# Patient Record
Sex: FEMALE | Race: WHITE | NOT HISPANIC OR LATINO | URBAN - METROPOLITAN AREA
[De-identification: names, ages, dates, MRNs, and addresses within clinical notes are randomized per-mention and may not be internally consistent; named-entity substitution may affect disease eponyms.]

---

## 2021-01-13 ENCOUNTER — TELEPHONE ENCOUNTER (OUTPATIENT)
Dept: URBAN - METROPOLITAN AREA CLINIC 92 | Facility: CLINIC | Age: 66
End: 2021-01-13

## 2021-03-12 ENCOUNTER — OFFICE VISIT (OUTPATIENT)
Dept: URBAN - METROPOLITAN AREA TELEHEALTH 2 | Facility: TELEHEALTH | Age: 66
End: 2021-03-12
Payer: COMMERCIAL

## 2021-03-12 VITALS — HEIGHT: 62 IN | BODY MASS INDEX: 23.92 KG/M2 | WEIGHT: 130 LBS

## 2021-03-12 DIAGNOSIS — R19.6 HALITOSIS: ICD-10-CM

## 2021-03-12 DIAGNOSIS — K57.30 COLON, DIVERTICULOSIS: ICD-10-CM

## 2021-03-12 DIAGNOSIS — Z83.71 FAMILY HISTORY OF COLONIC POLYPS: ICD-10-CM

## 2021-03-12 DIAGNOSIS — K31.89 SUBMUCOSAL LESION OF STOMACH: ICD-10-CM

## 2021-03-12 DIAGNOSIS — R12 HEARTBURN: ICD-10-CM

## 2021-03-12 PROCEDURE — 99243 OFF/OP CNSLTJ NEW/EST LOW 30: CPT | Performed by: INTERNAL MEDICINE

## 2021-03-12 PROCEDURE — 99203 OFFICE O/P NEW LOW 30 MIN: CPT | Performed by: INTERNAL MEDICINE

## 2021-03-12 RX ORDER — BUPROPION HCL 150 MG
TAKE 1 TABLET (150 MG) BY ORAL ROUTE ONCE DAILY FOR 30 DAYS TABLET, EXTENDED RELEASE 24 HR ORAL 1
Qty: 30 | Refills: 0 | Status: ACTIVE | COMMUNITY
Start: 1900-01-01

## 2021-03-12 RX ORDER — TELMISARTAN 20 MG/1
TAKE 1 TABLET (20 MG) BY ORAL ROUTE ONCE DAILY FOR 30 DAYS TABLET ORAL 1
Qty: 30 | Refills: 0 | Status: ACTIVE | COMMUNITY
Start: 1900-01-01

## 2021-03-12 RX ORDER — CETIRIZINE HYDROCHLORIDE 10 MG/1
TAKE 1 TABLET (10 MG) BY ORAL ROUTE ONCE DAILY FOR 30 DAYS TABLET, FILM COATED ORAL 1
Qty: 30 | Refills: 0 | Status: ACTIVE | COMMUNITY
Start: 1900-01-01

## 2021-03-12 RX ORDER — SODIUM, POTASSIUM,MAG SULFATES 17.5-3.13G
354ML SOLUTION, RECONSTITUTED, ORAL ORAL
Qty: 354 MILLILITER | Refills: 0 | OUTPATIENT
Start: 2021-03-12 | End: 2021-03-13

## 2021-03-12 NOTE — HPI-OTHER HISTORIES
This is a 66-year-old female who presents for consult referred by Dr. Zeeshan Gallardo. She was last seen in 2016.   She had a colonoscopy at age 52 that showed a hyperplastic polyp. She had a colonoscopy on 9/28/16 that showed diverticulosis of the sigmoid colon and internal hemorrhoids. Biopsies of the terminal ileum and colon were unremarkable. She had sx c/w IBS-D and was recommended to treat with a course of Xifaxan for 2weeks but the patient declined treatment citing high deductible for the medication. She is taking citrucel daily and BM formed and daily. No hematochezia or melena. Her father had colon polyps.   Patient previously was seen in 2013 for a gastric submucosal lesion. She initially had EUS at [place] for a gastric submucosal lesion in the body of the stomach. It arises from the muscularis propria. She had repeat EUS at [facility] on 4/16/13 that demonstrated 1.1 x 0.8cm gastric nodule arising from muscularis propria. Deep well biopsies were non-diagnostic. She was recommended to have repeat procedure in 1-2years to ensure stability. Repeat procedure on 7/19/16 showed stable lesion measuring 1.1cm by 0.8cm. No additional biopsies were obtained. There was an also 2cm hiatal hernia, gastritis without H pylori and fundic gland polyps.   She has reflux and was well controlled on Omeprazole 40mg po daily but she stopped this years ago. She now notes with COVID and wearing a mask sign halitosis. Recently she had nocturnal heartburn X 1 but otherwise denies regurgitation or dysphagia. No chest pain. No N/V. She saw her PCP and was given omeprazole and notes halitosis has resolved. She denies any abd pain, anorexia or weight loss.

## 2021-05-13 ENCOUNTER — OFFICE VISIT (OUTPATIENT)
Dept: URBAN - METROPOLITAN AREA SURGERY CENTER 16 | Facility: SURGERY CENTER | Age: 66
End: 2021-05-13

## 2021-05-24 ENCOUNTER — OFFICE VISIT (OUTPATIENT)
Dept: URBAN - METROPOLITAN AREA TELEHEALTH 2 | Facility: TELEHEALTH | Age: 66
End: 2021-05-24

## 2021-05-24 ENCOUNTER — TELEPHONE ENCOUNTER (OUTPATIENT)
Dept: URBAN - METROPOLITAN AREA CLINIC 92 | Facility: CLINIC | Age: 66
End: 2021-05-24

## 2021-05-24 RX ORDER — OMEPRAZOLE 40 MG/1
1 CAPSULE 30 MINUTES BEFORE MORNING MEAL CAPSULE, DELAYED RELEASE ORAL ONCE A DAY
Qty: 30 | OUTPATIENT
Start: 2021-05-24

## 2021-05-24 NOTE — HPI-OTHER HISTORIES
This is a 66-year-old female who presents for consult referred by Dr. Zeehsan Gallardo. She was last seen in 2016.   She had a colonoscopy at age 52 that showed a hyperplastic polyp. She had a colonoscopy on 9/28/16 that showed diverticulosis of the sigmoid colon and internal hemorrhoids. Biopsies of the terminal ileum and colon were unremarkable. She had sx c/w IBS-D and was recommended to treat with a course of Xifaxan for 2weeks but the patient declined treatment citing high deductible for the medication. She is taking citrucel daily and BM formed and daily. No hematochezia or melena. Her father had colon polyps.   Patient previously was seen in 2013 for a gastric submucosal lesion. She initially had EUS at [place] for a gastric submucosal lesion in the body of the stomach. It arises from the muscularis propria. She had repeat EUS at [facility] on 4/16/13 that demonstrated 1.1 x 0.8cm gastric nodule arising from muscularis propria. Deep well biopsies were non-diagnostic. She was recommended to have repeat procedure in 1-2years to ensure stability. Repeat procedure on 7/19/16 showed stable lesion measuring 1.1cm by 0.8cm. No additional biopsies were obtained. There was an also 2cm hiatal hernia, gastritis without H pylori and fundic gland polyps.   She has reflux and was well controlled on Omeprazole 40mg po daily but she stopped this years ago. She now notes with COVID and wearing a mask sign halitosis. Recently she had nocturnal heartburn X 1 but otherwise denies regurgitation or dysphagia. No chest pain. No N/V. She saw her PCP and was given omeprazole and notes halitosis has resolved. She denies any abd pain, anorexia or weight loss.

## 2021-07-14 ENCOUNTER — TELEPHONE ENCOUNTER (OUTPATIENT)
Dept: URBAN - METROPOLITAN AREA CLINIC 92 | Facility: CLINIC | Age: 66
End: 2021-07-14

## 2021-07-15 ENCOUNTER — CLAIMS CREATED FROM THE CLAIM WINDOW (OUTPATIENT)
Dept: URBAN - METROPOLITAN AREA CLINIC 4 | Facility: CLINIC | Age: 66
End: 2021-07-15
Payer: COMMERCIAL

## 2021-07-15 ENCOUNTER — OFFICE VISIT (OUTPATIENT)
Dept: URBAN - METROPOLITAN AREA SURGERY CENTER 16 | Facility: SURGERY CENTER | Age: 66
End: 2021-07-15
Payer: COMMERCIAL

## 2021-07-15 DIAGNOSIS — K31.89 GASTRIC FOVEOLAR HYPERPLASIA: ICD-10-CM

## 2021-07-15 DIAGNOSIS — K63.89 POLYP OF ILEUM: ICD-10-CM

## 2021-07-15 DIAGNOSIS — K31.89 ACQUIRED DEFORMITY OF DUODENUM: ICD-10-CM

## 2021-07-15 DIAGNOSIS — R12 HEARTBURN: ICD-10-CM

## 2021-07-15 DIAGNOSIS — Z83.71 FAMILY HISTORY OF COLONIC POLYPS: ICD-10-CM

## 2021-07-15 PROCEDURE — 43239 EGD BIOPSY SINGLE/MULTIPLE: CPT | Performed by: INTERNAL MEDICINE

## 2021-07-15 PROCEDURE — G8907 PT DOC NO EVENTS ON DISCHARG: HCPCS | Performed by: INTERNAL MEDICINE

## 2021-07-15 PROCEDURE — 88342 IMHCHEM/IMCYTCHM 1ST ANTB: CPT | Performed by: PATHOLOGY

## 2021-07-15 PROCEDURE — 88312 SPECIAL STAINS GROUP 1: CPT | Performed by: PATHOLOGY

## 2021-07-15 PROCEDURE — 88305 TISSUE EXAM BY PATHOLOGIST: CPT | Performed by: PATHOLOGY

## 2021-07-15 PROCEDURE — G0105 COLORECTAL SCRN; HI RISK IND: HCPCS | Performed by: INTERNAL MEDICINE

## 2021-07-15 RX ORDER — TELMISARTAN 20 MG/1
TAKE 1 TABLET (20 MG) BY ORAL ROUTE ONCE DAILY FOR 30 DAYS TABLET ORAL 1
Qty: 30 | Refills: 0 | Status: ACTIVE | COMMUNITY
Start: 1900-01-01

## 2021-07-15 RX ORDER — CETIRIZINE HYDROCHLORIDE 10 MG/1
TAKE 1 TABLET (10 MG) BY ORAL ROUTE ONCE DAILY FOR 30 DAYS TABLET, FILM COATED ORAL 1
Qty: 30 | Refills: 0 | Status: ACTIVE | COMMUNITY
Start: 1900-01-01

## 2021-07-15 RX ORDER — OMEPRAZOLE 40 MG/1
1 CAPSULE 30 MINUTES BEFORE MORNING MEAL CAPSULE, DELAYED RELEASE ORAL ONCE A DAY
Qty: 30 | Status: ACTIVE | COMMUNITY
Start: 2021-05-24

## 2021-07-15 RX ORDER — BUPROPION HCL 150 MG
TAKE 1 TABLET (150 MG) BY ORAL ROUTE ONCE DAILY FOR 30 DAYS TABLET, EXTENDED RELEASE 24 HR ORAL 1
Qty: 30 | Refills: 0 | Status: ACTIVE | COMMUNITY
Start: 1900-01-01

## 2021-07-30 ENCOUNTER — OFFICE VISIT (OUTPATIENT)
Dept: URBAN - METROPOLITAN AREA CLINIC 92 | Facility: CLINIC | Age: 66
End: 2021-07-30
Payer: COMMERCIAL

## 2021-07-30 ENCOUNTER — TELEPHONE ENCOUNTER (OUTPATIENT)
Dept: URBAN - METROPOLITAN AREA CLINIC 92 | Facility: CLINIC | Age: 66
End: 2021-07-30

## 2021-07-30 VITALS
HEART RATE: 65 BPM | DIASTOLIC BLOOD PRESSURE: 78 MMHG | WEIGHT: 129 LBS | HEIGHT: 62 IN | SYSTOLIC BLOOD PRESSURE: 127 MMHG | BODY MASS INDEX: 23.74 KG/M2 | TEMPERATURE: 98 F

## 2021-07-30 DIAGNOSIS — R12 HEARTBURN: ICD-10-CM

## 2021-07-30 DIAGNOSIS — K57.90 DIVERTICULOSIS: ICD-10-CM

## 2021-07-30 DIAGNOSIS — R19.6 HALITOSIS: ICD-10-CM

## 2021-07-30 DIAGNOSIS — Z83.71 FAMILY HISTORY OF COLONIC POLYPS: ICD-10-CM

## 2021-07-30 DIAGNOSIS — K31.89 SUBMUCOSAL LESION OF STOMACH: ICD-10-CM

## 2021-07-30 PROBLEM — 79879001: Status: ACTIVE | Noted: 2021-03-12

## 2021-07-30 PROCEDURE — 99214 OFFICE O/P EST MOD 30 MIN: CPT | Performed by: INTERNAL MEDICINE

## 2021-07-30 RX ORDER — CETIRIZINE HYDROCHLORIDE 10 MG/1
TAKE 1 TABLET (10 MG) BY ORAL ROUTE ONCE DAILY FOR 30 DAYS TABLET, FILM COATED ORAL 1
Qty: 30 | Refills: 0 | Status: ACTIVE | COMMUNITY
Start: 1900-01-01

## 2021-07-30 RX ORDER — OMEPRAZOLE 40 MG/1
1 CAPSULE 30 MINUTES BEFORE MORNING MEAL CAPSULE, DELAYED RELEASE ORAL ONCE A DAY
Qty: 30 | Status: ON HOLD | COMMUNITY
Start: 2021-05-24

## 2021-07-30 RX ORDER — TELMISARTAN 20 MG/1
TAKE 1 TABLET (20 MG) BY ORAL ROUTE ONCE DAILY FOR 30 DAYS TABLET ORAL 1
Qty: 30 | Refills: 0 | Status: ACTIVE | COMMUNITY
Start: 1900-01-01

## 2021-07-30 RX ORDER — BUPROPION HCL 150 MG
TAKE 1 TABLET (150 MG) BY ORAL ROUTE ONCE DAILY FOR 30 DAYS TABLET, EXTENDED RELEASE 24 HR ORAL 1
Qty: 30 | Refills: 0 | Status: ACTIVE | COMMUNITY
Start: 1900-01-01

## 2021-07-30 NOTE — HPI-TODAY'S VISIT:
This is a 66-year-old female who presents for follow-up after recent endoscopy.  She had a colonoscopy at age 52 that showed a hyperplastic polyp. She had a colonoscopy on 9/28/16 that showed diverticulosis of the sigmoid colon and internal hemorrhoids. Biopsies of the terminal ileum and colon were unremarkable. She had sx c/w IBS-D and was recommended to treat with a course of Xifaxan for 2weeks but the patient declined treatment citing high deductible for the medication. She is taking citrucel daily and BM formed and daily. No hematochezia or melena. Her father had colon polyps.   Patient previously was seen in 2013 for a gastric submucosal lesion. She initially had EUS at [place] for a gastric submucosal lesion in the body of the stomach. It arises from the muscularis propria. [facility] that demonstrated 1.1 x 0.8cm gastric nodule arising from muscularis propria. Deep well biopsies were non-diagnostic. She was recommended to have repeat procedure in 1-2years to ensure stability. Repeat procedure on 7/19/16 showed stable lesion measuring 1.1cm by 0.8cm. No additional biopsies were obtained. There was an also 2cm hiatal hernia, gastritis without H pylori and fundic gland polyps.   She has reflux and was well controlled on Omeprazole 40mg po daily but she stopped this years ago. She now notes with COVID and wearing a mask sign halitosis. Recently she had nocturnal heartburn X 1 but otherwise denies regurgitation or dysphagia. No chest pain. No N/V. She saw her PCP and was given omeprazole and notes halitosis has resolved. She denies any abd pain, anorexia or weight loss.   She underwent EGD and colonoscopy on 07/15/2021 that demonstrated a 1.4 cm submucosal lesion which is slightly increased in size.  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  Colonoscopy on 07/15/2021 revealed diverticulosis in sigmoid colon and internal hemorrhoids

## 2021-08-13 PROBLEM — 397881000: Status: ACTIVE | Noted: 2021-03-11

## 2021-08-30 ENCOUNTER — OFFICE VISIT (OUTPATIENT)
Dept: URBAN - METROPOLITAN AREA MEDICAL CENTER 28 | Facility: MEDICAL CENTER | Age: 66
End: 2021-08-30
Payer: COMMERCIAL

## 2021-08-30 DIAGNOSIS — K31.89 ACQUIRED DEFORMITY OF DUODENUM: ICD-10-CM

## 2021-08-30 DIAGNOSIS — R93.3 ABN FINDINGS-GI TRACT: ICD-10-CM

## 2021-08-30 PROCEDURE — 43242 EGD US FINE NEEDLE BX/ASPIR: CPT | Performed by: INTERNAL MEDICINE

## 2021-08-30 RX ORDER — CETIRIZINE HYDROCHLORIDE 10 MG/1
TAKE 1 TABLET (10 MG) BY ORAL ROUTE ONCE DAILY FOR 30 DAYS TABLET, FILM COATED ORAL 1
Qty: 30 | Refills: 0 | Status: ACTIVE | COMMUNITY
Start: 1900-01-01

## 2021-08-30 RX ORDER — BUPROPION HCL 150 MG
TAKE 1 TABLET (150 MG) BY ORAL ROUTE ONCE DAILY FOR 30 DAYS TABLET, EXTENDED RELEASE 24 HR ORAL 1
Qty: 30 | Refills: 0 | Status: ACTIVE | COMMUNITY
Start: 1900-01-01

## 2021-08-30 RX ORDER — TELMISARTAN 20 MG/1
TAKE 1 TABLET (20 MG) BY ORAL ROUTE ONCE DAILY FOR 30 DAYS TABLET ORAL 1
Qty: 30 | Refills: 0 | Status: ACTIVE | COMMUNITY
Start: 1900-01-01

## 2021-08-30 RX ORDER — OMEPRAZOLE 40 MG/1
1 CAPSULE 30 MINUTES BEFORE MORNING MEAL CAPSULE, DELAYED RELEASE ORAL ONCE A DAY
Qty: 30 | Status: ON HOLD | COMMUNITY
Start: 2021-05-24

## 2023-10-20 ENCOUNTER — TELEPHONE ENCOUNTER (OUTPATIENT)
Dept: URBAN - METROPOLITAN AREA CLINIC 92 | Facility: CLINIC | Age: 68
End: 2023-10-20

## 2023-11-17 ENCOUNTER — OFFICE VISIT (OUTPATIENT)
Dept: URBAN - METROPOLITAN AREA MEDICAL CENTER 28 | Facility: MEDICAL CENTER | Age: 68
End: 2023-11-17
Payer: COMMERCIAL

## 2023-11-17 DIAGNOSIS — K31.89 ACHYLIA: ICD-10-CM

## 2023-11-17 DIAGNOSIS — K29.50 ANTRAL GASTRITIS: ICD-10-CM

## 2023-11-17 DIAGNOSIS — R93.3 ABN FINDINGS-GI TRACT: ICD-10-CM

## 2023-11-17 PROCEDURE — 43239 EGD BIOPSY SINGLE/MULTIPLE: CPT | Performed by: INTERNAL MEDICINE

## 2023-11-17 PROCEDURE — 43237 ENDOSCOPIC US EXAM ESOPH: CPT | Performed by: INTERNAL MEDICINE

## 2023-11-17 RX ORDER — OMEPRAZOLE 40 MG/1
1 CAPSULE 30 MINUTES BEFORE MORNING MEAL CAPSULE, DELAYED RELEASE ORAL ONCE A DAY
Qty: 30 | Status: ON HOLD | COMMUNITY
Start: 2021-05-24

## 2023-11-17 RX ORDER — CETIRIZINE HYDROCHLORIDE 10 MG/1
TAKE 1 TABLET (10 MG) BY ORAL ROUTE ONCE DAILY FOR 30 DAYS TABLET, FILM COATED ORAL 1
Qty: 30 | Refills: 0 | Status: ACTIVE | COMMUNITY
Start: 1900-01-01

## 2023-11-17 RX ORDER — BUPROPION HCL 150 MG
TAKE 1 TABLET (150 MG) BY ORAL ROUTE ONCE DAILY FOR 30 DAYS TABLET, EXTENDED RELEASE 24 HR ORAL 1
Qty: 30 | Refills: 0 | Status: ACTIVE | COMMUNITY
Start: 1900-01-01

## 2023-11-17 RX ORDER — TELMISARTAN 20 MG/1
TAKE 1 TABLET (20 MG) BY ORAL ROUTE ONCE DAILY FOR 30 DAYS TABLET ORAL 1
Qty: 30 | Refills: 0 | Status: ACTIVE | COMMUNITY
Start: 1900-01-01

## 2023-11-27 ENCOUNTER — TELEPHONE ENCOUNTER (OUTPATIENT)
Dept: URBAN - METROPOLITAN AREA CLINIC 92 | Facility: CLINIC | Age: 68
End: 2023-11-27

## 2023-12-13 ENCOUNTER — RX ONLY (OUTPATIENT)
Age: 68
Setting detail: RX ONLY
End: 2023-12-13

## 2023-12-13 ENCOUNTER — APPOINTMENT (RX ONLY)
Dept: URBAN - METROPOLITAN AREA CLINIC 164 | Facility: CLINIC | Age: 68
Setting detail: DERMATOLOGY
End: 2023-12-13

## 2023-12-13 DIAGNOSIS — D18.0 HEMANGIOMA: ICD-10-CM

## 2023-12-13 DIAGNOSIS — L81.4 OTHER MELANIN HYPERPIGMENTATION: ICD-10-CM

## 2023-12-13 DIAGNOSIS — L73.8 OTHER SPECIFIED FOLLICULAR DISORDERS: ICD-10-CM

## 2023-12-13 DIAGNOSIS — D22 MELANOCYTIC NEVI: ICD-10-CM

## 2023-12-13 DIAGNOSIS — L82.1 OTHER SEBORRHEIC KERATOSIS: ICD-10-CM

## 2023-12-13 PROBLEM — D18.01 HEMANGIOMA OF SKIN AND SUBCUTANEOUS TISSUE: Status: ACTIVE | Noted: 2023-12-13

## 2023-12-13 PROBLEM — D22.5 MELANOCYTIC NEVI OF TRUNK: Status: ACTIVE | Noted: 2023-12-13

## 2023-12-13 PROCEDURE — ? COUNSELING

## 2023-12-13 PROCEDURE — 99213 OFFICE O/P EST LOW 20 MIN: CPT

## 2023-12-13 RX ORDER — MUPIROCIN 20 MG/G
OINTMENT TOPICAL
Qty: 22 | Refills: 0 | Status: ERX | COMMUNITY
Start: 2023-12-13

## 2023-12-13 RX ORDER — HYDROCORTISONE 25 MG/G
CREAM TOPICAL
Qty: 30 | Refills: 2 | Status: ERX | COMMUNITY
Start: 2023-12-13

## 2023-12-13 ASSESSMENT — LOCATION DETAILED DESCRIPTION DERM
LOCATION DETAILED: RIGHT MEDIAL UPPER BACK
LOCATION DETAILED: LEFT FOREHEAD
LOCATION DETAILED: INFERIOR THORACIC SPINE
LOCATION DETAILED: LEFT MEDIAL UPPER BACK

## 2023-12-13 ASSESSMENT — LOCATION SIMPLE DESCRIPTION DERM
LOCATION SIMPLE: LEFT UPPER BACK
LOCATION SIMPLE: UPPER BACK
LOCATION SIMPLE: LEFT FOREHEAD
LOCATION SIMPLE: RIGHT UPPER BACK

## 2023-12-13 ASSESSMENT — LOCATION ZONE DERM
LOCATION ZONE: TRUNK
LOCATION ZONE: FACE

## 2023-12-13 NOTE — HPI: EVALUATION OF SKIN LESION(S)
Hpi Title: Evaluation of Skin Lesions
What Type Of Note Output Would You Prefer (Optional)?: Standard Output
Additional History: Pt asked for refills for HTC 2.5% and Mupirocin Oint

## 2024-01-23 ENCOUNTER — TELEPHONE ENCOUNTER (OUTPATIENT)
Dept: URBAN - METROPOLITAN AREA CLINIC 92 | Facility: CLINIC | Age: 69
End: 2024-01-23

## 2024-01-24 ENCOUNTER — DASHBOARD ENCOUNTERS (OUTPATIENT)
Age: 69
End: 2024-01-24

## 2024-01-25 ENCOUNTER — OFFICE VISIT (OUTPATIENT)
Dept: URBAN - METROPOLITAN AREA TELEHEALTH 2 | Facility: TELEHEALTH | Age: 69
End: 2024-01-25
Payer: COMMERCIAL

## 2024-01-25 VITALS — HEIGHT: 62 IN | WEIGHT: 138 LBS | BODY MASS INDEX: 25.4 KG/M2

## 2024-01-25 DIAGNOSIS — K31.89 SUBMUCOSAL LESION OF STOMACH: ICD-10-CM

## 2024-01-25 DIAGNOSIS — Z83.71 FAMILY HISTORY OF COLONIC POLYPS: ICD-10-CM

## 2024-01-25 DIAGNOSIS — R12 HEARTBURN: ICD-10-CM

## 2024-01-25 DIAGNOSIS — K57.90 DIVERTICULOSIS: ICD-10-CM

## 2024-01-25 PROCEDURE — 99441 PHONE E/M BY PHYS 5-10 MIN: CPT | Performed by: INTERNAL MEDICINE

## 2024-01-25 RX ORDER — TELMISARTAN 20 MG/1
TAKE 1 TABLET (20 MG) BY ORAL ROUTE ONCE DAILY FOR 30 DAYS TABLET ORAL 1
Qty: 30 | Refills: 0 | Status: ACTIVE | COMMUNITY
Start: 1900-01-01

## 2024-01-25 RX ORDER — BUPROPION HCL 150 MG
TAKE 1 TABLET (150 MG) BY ORAL ROUTE ONCE DAILY FOR 30 DAYS TABLET, EXTENDED RELEASE 24 HR ORAL 1
Qty: 30 | Refills: 0 | Status: ACTIVE | COMMUNITY
Start: 1900-01-01

## 2024-01-25 RX ORDER — OMEPRAZOLE 40 MG/1
1 CAPSULE 30 MINUTES BEFORE MORNING MEAL CAPSULE, DELAYED RELEASE ORAL ONCE A DAY
Qty: 30 | COMMUNITY
Start: 2021-05-24

## 2024-01-25 RX ORDER — CETIRIZINE HYDROCHLORIDE 10 MG/1
TAKE 1 TABLET (10 MG) BY ORAL ROUTE ONCE DAILY FOR 30 DAYS TABLET, FILM COATED ORAL 1
Qty: 30 | Refills: 0 | Status: ACTIVE | COMMUNITY
Start: 1900-01-01

## 2024-03-14 ENCOUNTER — RX ONLY (OUTPATIENT)
Age: 69
Setting detail: RX ONLY
End: 2024-03-14

## 2024-03-14 RX ORDER — MUPIROCIN CALCIUM 20 MG/G
SMALL AMOUNT CREAM TOPICAL
Qty: 30 | Refills: 2 | Status: ERX | COMMUNITY
Start: 2024-03-14